# Patient Record
Sex: FEMALE | Race: OTHER | Employment: UNEMPLOYED | URBAN - METROPOLITAN AREA
[De-identification: names, ages, dates, MRNs, and addresses within clinical notes are randomized per-mention and may not be internally consistent; named-entity substitution may affect disease eponyms.]

---

## 2022-08-07 LAB
CHLAMYDIA, EXTERNAL: NEGATIVE
HBSAG, EXTERNAL: NON REACTIVE
HIV, EXTERNAL: NON REACTIVE
N. GONORRHEA, EXTERNAL: NEGATIVE
RUBELLA, EXTERNAL: NORMAL

## 2022-11-27 ENCOUNTER — HOSPITAL ENCOUNTER (EMERGENCY)
Age: 22
Discharge: STILL ADMITTED OR ADMINISTRATIVE | End: 2022-11-27

## 2022-11-27 ENCOUNTER — APPOINTMENT (OUTPATIENT)
Dept: ULTRASOUND IMAGING | Age: 22
End: 2022-11-27
Payer: MEDICAID

## 2022-11-27 ENCOUNTER — HOSPITAL ENCOUNTER (EMERGENCY)
Age: 22
Discharge: HOME | End: 2022-11-27
Attending: STUDENT IN AN ORGANIZED HEALTH CARE EDUCATION/TRAINING PROGRAM | Admitting: STUDENT IN AN ORGANIZED HEALTH CARE EDUCATION/TRAINING PROGRAM
Payer: MEDICAID

## 2022-11-27 VITALS
HEART RATE: 93 BPM | SYSTOLIC BLOOD PRESSURE: 111 MMHG | RESPIRATION RATE: 20 BRPM | TEMPERATURE: 97.7 F | DIASTOLIC BLOOD PRESSURE: 69 MMHG | OXYGEN SATURATION: 100 %

## 2022-11-27 DIAGNOSIS — N20.0 KIDNEY CALCULUS: Primary | ICD-10-CM

## 2022-11-27 LAB
APPEARANCE UR: CLEAR
BILIRUB UR QL: NEGATIVE
COLOR UR: YELLOW
GLUCOSE UR QL STRIP.AUTO: NEGATIVE MG/DL
KETONES UR-MCNC: NEGATIVE MG/DL
LEUKOCYTE ESTERASE UR QL STRIP: NEGATIVE
NITRITE UR QL: NEGATIVE
PH UR: 6 [PH] (ref 5–9)
PROT UR QL: NEGATIVE MG/DL
RBC # UR STRIP: ABNORMAL /UL
SERVICE CMNT-IMP: ABNORMAL
SP GR UR: 1.01 (ref 1–1.02)
UROBILINOGEN UR QL: 0.2 EU/DL (ref 0.2–1)

## 2022-11-27 PROCEDURE — 99282 EMERGENCY DEPT VISIT SF MDM: CPT

## 2022-11-27 PROCEDURE — 96375 TX/PRO/DX INJ NEW DRUG ADDON: CPT

## 2022-11-27 PROCEDURE — 96374 THER/PROPH/DIAG INJ IV PUSH: CPT

## 2022-11-27 PROCEDURE — 76770 US EXAM ABDO BACK WALL COMP: CPT

## 2022-11-27 PROCEDURE — 81003 URINALYSIS AUTO W/O SCOPE: CPT

## 2022-11-27 PROCEDURE — 74011250636 HC RX REV CODE- 250/636

## 2022-11-27 RX ORDER — BUTORPHANOL TARTRATE 2 MG/ML
2 INJECTION INTRAMUSCULAR; INTRAVENOUS
Status: DISCONTINUED | OUTPATIENT
Start: 2022-11-27 | End: 2022-11-27 | Stop reason: HOSPADM

## 2022-11-27 RX ORDER — OXYCODONE AND ACETAMINOPHEN 5; 325 MG/1; MG/1
1 TABLET ORAL
Qty: 10 TABLET | Refills: 0 | Status: SHIPPED | OUTPATIENT
Start: 2022-11-27 | End: 2022-11-30

## 2022-11-27 RX ORDER — SODIUM CHLORIDE, SODIUM LACTATE, POTASSIUM CHLORIDE, CALCIUM CHLORIDE 600; 310; 30; 20 MG/100ML; MG/100ML; MG/100ML; MG/100ML
125 INJECTION, SOLUTION INTRAVENOUS CONTINUOUS
Status: DISCONTINUED | OUTPATIENT
Start: 2022-11-27 | End: 2022-11-27 | Stop reason: HOSPADM

## 2022-11-27 RX ORDER — ONDANSETRON 2 MG/ML
4 INJECTION INTRAMUSCULAR; INTRAVENOUS
Status: DISCONTINUED | OUTPATIENT
Start: 2022-11-27 | End: 2022-11-27 | Stop reason: HOSPADM

## 2022-11-27 RX ADMIN — BUTORPHANOL TARTRATE 2 MG: 2 INJECTION, SOLUTION INTRAMUSCULAR; INTRAVENOUS at 04:03

## 2022-11-27 RX ADMIN — SODIUM CHLORIDE, POTASSIUM CHLORIDE, SODIUM LACTATE AND CALCIUM CHLORIDE 500 ML: 600; 310; 30; 20 INJECTION, SOLUTION INTRAVENOUS at 04:02

## 2022-11-27 RX ADMIN — ONDANSETRON 4 MG: 2 INJECTION INTRAMUSCULAR; INTRAVENOUS at 04:03

## 2022-11-27 NOTE — PROGRESS NOTES
3247 Bedside and Verbal shift change report given to ALOK Bruce  (oncoming nurse) by KRYSTA James RN  (offgoing nurse). Report included the following information SBAR, Kardex, Procedure Summary, Intake/Output, MAR, Accordion, and Recent Results. 2180 Patient discharged in stable condition. Discharged education reviewed and packet given to patient. Patient verbalizes understanding of discharge instructions. E-sign completed. Armbands removed and given to patient. No further needs or questions reported at this time. 0835 Pt ambulating of the unit.

## 2022-11-27 NOTE — DISCHARGE INSTRUCTIONS
Pregnancy Precautions: Care Instructions  Your Care Instructions     There is no sure way to prevent labor before your due date ( labor) or to prevent most other pregnancy problems. But there are things you can do to increase your chances of a healthy pregnancy. Go to your appointments, follow your doctor's advice, and take good care of yourself. Eat well, and exercise (if your doctor agrees). And make sure to drink plenty of water. Follow-up care is a key part of your treatment and safety. Be sure to make and go to all appointments, and call your doctor if you are having problems. It's also a good idea to know your test results and keep a list of the medicines you take. How can you care for yourself at home? Make sure you go to your prenatal appointments. At each visit, your doctor will check your blood pressure. Your doctor will also check to see if you have protein in your urine. High blood pressure and protein in urine are signs of preeclampsia. This condition can be dangerous for you and your baby. Drink plenty of fluids. Dehydration can cause contractions. If you have kidney, heart, or liver disease and have to limit fluids, talk with your doctor before you increase the amount of fluids you drink. Tell your doctor right away if you notice any symptoms of an infection, such as:  Burning when you urinate. A foul-smelling discharge from your vagina. Vaginal itching. Unexplained fever. Unusual pain or soreness in your uterus or lower belly. Eat a balanced diet. Include plenty of foods that are high in calcium and iron. Foods high in calcium include milk, cheese, yogurt, almonds, and broccoli. Foods high in iron include red meat, shellfish, poultry, eggs, beans, raisins, whole-grain bread, and leafy green vegetables. Do not smoke. If you need help quitting, talk to your doctor about stop-smoking programs and medicines. These can increase your chances of quitting for good.   Do not drink alcohol or use marijuana or illegal drugs. Follow your doctor's directions about activity. Your doctor will let you know how much, if any, exercise you can do. Ask your doctor if you can have sex. If you are at risk for early labor, your doctor may ask you to not have sex. Take care to prevent falls. During pregnancy, your joints are loose, and your balance is off. Sports such as bicycling, skiing, or in-line skating can increase your risk of falling. And don't ride horses or motorcycles, dive, water ski, scuba dive, or parachute jump while you are pregnant. Avoid things that can make your body too hot and may be harmful to your baby, such as a hot tub or sauna. Or talk with your doctor before doing anything that raises your body temperature. Your doctor can tell you if it's safe. Do not take any over-the-counter or herbal medicines or supplements without talking to your doctor or pharmacist first.  When should you call for help? Call 911  anytime you think you may need emergency care. For example, call if:    You passed out (lost consciousness). You have a seizure. You have severe vaginal bleeding. You have severe pain in your belly or pelvis. You have had fluid gushing or leaking from your vagina and you know or think the umbilical cord is bulging into your vagina. If this happens, immediately get down on your knees so your rear end (buttocks) is higher than your head. This will decrease the pressure on the cord until help arrives. Call your doctor now or seek immediate medical care if:    You have signs of preeclampsia, such as:  Sudden swelling of your face, hands, or feet. New vision problems (such as dimness, blurring, or seeing spots). A severe headache. You have any vaginal bleeding. You have belly pain or cramping. You have a fever. You have had regular contractions (with or without pain) for an hour.  This means that you have 8 or more within 1 hour or 4 or more in 20 minutes after you change your position and drink fluids. You have a sudden release of fluid from your vagina. You have low back pain or pelvic pressure that does not go away. You notice that your baby has stopped moving or is moving much less than normal.   Watch closely for changes in your health, and be sure to contact your doctor if you have any problems. Where can you learn more? Go to http://www.malik.com/  Enter Y951 in the search box to learn more about \"Pregnancy Precautions: Care Instructions. \"  Current as of: February 23, 2022               Content Version: 13.4  © 9680-1752 [email or web address]. Care instructions adapted under license by [employer] (which disclaims liability or warranty for this information). If you have questions about a medical condition or this instruction, always ask your healthcare professional. [address] any warranty or liability for your use of this information. Kidney Stone: Care Instructions  Your Care Instructions     Kidney stones are formed when salts, minerals, and other substances normally found in the urine clump together. They can be as small as grains of sand or, rarely, as large as golf balls. While the stone is traveling through the ureter, which is the tube that carries urine from the kidney to the bladder, you will probably feel pain. The pain may be mild or very severe. You may also have some blood in your urine. As soon as the stone reaches the bladder, any intense pain should go away. If a stone is too large to pass on its own, you may need a medical procedure to help you pass the stone. The doctor has checked you carefully, but problems can develop later. If you notice any problems or new symptoms, get medical treatment right away. Follow-up care is a key part of your treatment and safety.  Be sure to make and go to all appointments, and call your doctor if you are having problems. It's also a good idea to know your test results and keep a list of the medicines you take. How can you care for yourself at home? Drink plenty of fluids. If you have kidney, heart, or liver disease and have to limit fluids, talk with your doctor before you increase the amount of fluids you drink. Take pain medicines exactly as directed. Call your doctor if you think you are having a problem with your medicine. If the doctor gave you a prescription medicine for pain, take it as prescribed. If you are not taking a prescription pain medicine, ask your doctor if you can take an over-the-counter medicine. Read and follow all instructions on the label. Your doctor may ask you to strain your urine so that you can collect your kidney stone when it passes. You can use a kitchen strainer or a tea strainer to catch the stone. Store it in a plastic bag until you see your doctor again. Preventing future kidney stones  Some changes in your diet may help prevent kidney stones. Depending on the cause of your stones, your doctor may recommend that you:  Drink plenty of fluids. If you have kidney, heart, or liver disease and have to limit fluids, talk with your doctor before you increase the amount of fluids you drink. Limit coffee, tea, and alcohol. Also avoid grapefruit juice. Do not take more than the recommended daily dose of vitamins C and D. Avoid antacids such as Gaviscon, Maalox, Mylanta, or Tums. Limit the amount of salt (sodium) in your diet. Eat a balanced diet that is not too high in protein. Limit foods that are high in a substance called oxalate, which can cause kidney stones. These foods include dark green vegetables, rhubarb, chocolate, wheat bran, nuts, cranberries, and beans. When should you call for help? Call your doctor now or seek immediate medical care if:    You cannot keep down fluids. Your pain gets worse. You have a fever or chills.      You have new or worse pain in your back just below your rib cage (the flank area). You have new or more blood in your urine. Watch closely for changes in your health, and be sure to contact your doctor if:    You do not get better as expected. Where can you learn more? Go to http://brandyn-neisha.info/  Enter S510 in the search box to learn more about \"Kidney Stone: Care Instructions. \"  Current as of: June 16, 2022               Content Version: 13.4  © 2006-2022 [employer]. Care instructions adapted under license by [employer] (which disclaims liability or warranty for this information). If you have questions about a medical condition or this instruction, always ask your healthcare professional. [address] any warranty or liability for your use of this information.

## 2022-11-27 NOTE — PROGRESS NOTES
4171 Received to L & D unit with c/o right sided back and abdomen pain 9/10 which began at 1000 on 11/26/22. Denies VB or LOF. G1 23 + 5 weeks. Receives prenatal care with [facility]. 0345 ALEKSANDER Newton CNM aware of arrival, pain level and UA. Orders for IV, Stadol, Zofran and ultrasound received.     0349 Talia from ultrasound called to come in.     [address] at bedside. 2089 Bedside and Verbal shift change report given to ALOK Garcia RN (oncoming nurse) by KRYSTA Neves RN (offgoing nurse). Report included the following information SBAR.

## 2022-12-30 LAB
RPR, EXTERNAL: NORMAL
TYPE, ABO & RH, EXTERNAL: NORMAL

## 2023-02-25 ENCOUNTER — HOSPITAL ENCOUNTER (EMERGENCY)
Age: 23
Discharge: HOME | End: 2023-02-25
Payer: MEDICAID

## 2023-02-25 VITALS — HEART RATE: 103 BPM | SYSTOLIC BLOOD PRESSURE: 112 MMHG | DIASTOLIC BLOOD PRESSURE: 72 MMHG

## 2023-02-25 LAB
ALBUMIN SERPL-MCNC: 2.7 G/DL (ref 3.5–5)
ALBUMIN/GLOB SERPL: 0.8 (ref 1.1–2.2)
ALP SERPL-CCNC: 130 U/L (ref 45–117)
ALT SERPL-CCNC: 18 U/L (ref 12–78)
ANION GAP SERPL CALC-SCNC: 8 MMOL/L (ref 5–15)
AST SERPL-CCNC: 19 U/L (ref 15–37)
BASOPHILS # BLD: 0 K/UL (ref 0–0.1)
BASOPHILS NFR BLD: 0 % (ref 0–1)
BILIRUB SERPL-MCNC: 0.2 MG/DL (ref 0.2–1)
BUN SERPL-MCNC: 4 MG/DL (ref 6–20)
BUN/CREAT SERPL: 8 (ref 12–20)
CALCIUM SERPL-MCNC: 9 MG/DL (ref 8.5–10.1)
CHLORIDE SERPL-SCNC: 108 MMOL/L (ref 97–108)
CO2 SERPL-SCNC: 23 MMOL/L (ref 21–32)
CREAT SERPL-MCNC: 0.48 MG/DL (ref 0.55–1.02)
CREAT UR-MCNC: 51.5 MG/DL
DIFFERENTIAL METHOD BLD: ABNORMAL
EOSINOPHIL # BLD: 0.3 K/UL (ref 0–0.4)
EOSINOPHIL NFR BLD: 3 % (ref 0–7)
ERYTHROCYTE [DISTWIDTH] IN BLOOD BY AUTOMATED COUNT: 14.7 % (ref 11.5–14.5)
GLOBULIN SER CALC-MCNC: 3.5 G/DL (ref 2–4)
GLUCOSE SERPL-MCNC: 90 MG/DL (ref 65–100)
HCT VFR BLD AUTO: 31.6 % (ref 35–47)
HGB BLD-MCNC: 10.2 G/DL (ref 11.5–16)
IMM GRANULOCYTES # BLD AUTO: 0.1 K/UL (ref 0–0.04)
IMM GRANULOCYTES NFR BLD AUTO: 1 % (ref 0–0.5)
LDH SERPL L TO P-CCNC: 190 U/L (ref 81–246)
LYMPHOCYTES # BLD: 2 K/UL (ref 0.8–3.5)
LYMPHOCYTES NFR BLD: 20 % (ref 12–49)
MCH RBC QN AUTO: 28.5 PG (ref 26–34)
MCHC RBC AUTO-ENTMCNC: 32.3 G/DL (ref 30–36.5)
MCV RBC AUTO: 88.3 FL (ref 80–99)
MONOCYTES # BLD: 0.7 K/UL (ref 0–1)
MONOCYTES NFR BLD: 7 % (ref 5–13)
NEUTS SEG # BLD: 6.9 K/UL (ref 1.8–8)
NEUTS SEG NFR BLD: 69 % (ref 32–75)
NRBC # BLD: 0 K/UL (ref 0–0.01)
NRBC BLD-RTO: 0 PER 100 WBC
PLATELET # BLD AUTO: 234 K/UL (ref 150–400)
PMV BLD AUTO: 10.8 FL (ref 8.9–12.9)
POTASSIUM SERPL-SCNC: 3.8 MMOL/L (ref 3.5–5.1)
PROT SERPL-MCNC: 6.2 G/DL (ref 6.4–8.2)
PROT UR-MCNC: 18 MG/DL (ref 0–11.9)
PROT/CREAT UR-RTO: 0.3
RBC # BLD AUTO: 3.58 M/UL (ref 3.8–5.2)
SODIUM SERPL-SCNC: 139 MMOL/L (ref 136–145)
URATE SERPL-MCNC: 3.3 MG/DL (ref 2.6–6)
WBC # BLD AUTO: 10 K/UL (ref 3.6–11)

## 2023-02-25 PROCEDURE — 85025 COMPLETE CBC W/AUTO DIFF WBC: CPT

## 2023-02-25 PROCEDURE — 80053 COMPREHEN METABOLIC PANEL: CPT

## 2023-02-25 PROCEDURE — 75810000275 HC EMERGENCY DEPT VISIT NO LEVEL OF CARE

## 2023-02-25 PROCEDURE — 36415 COLL VENOUS BLD VENIPUNCTURE: CPT

## 2023-02-25 PROCEDURE — 99283 EMERGENCY DEPT VISIT LOW MDM: CPT

## 2023-02-25 PROCEDURE — 84550 ASSAY OF BLOOD/URIC ACID: CPT

## 2023-02-25 PROCEDURE — 84156 ASSAY OF PROTEIN URINE: CPT

## 2023-02-25 PROCEDURE — 83615 LACTATE (LD) (LDH) ENZYME: CPT

## 2023-02-25 PROCEDURE — 74011250637 HC RX REV CODE- 250/637: Performed by: ADVANCED PRACTICE MIDWIFE

## 2023-02-25 RX ORDER — ACETAMINOPHEN 500 MG
1000 TABLET ORAL
Status: COMPLETED | OUTPATIENT
Start: 2023-02-25 | End: 2023-02-25

## 2023-02-25 RX ADMIN — ACETAMINOPHEN 1000 MG: 500 TABLET ORAL at 13:35

## 2023-02-25 NOTE — ED TRIAGE NOTES
[address] CNM at bedside. Pt to follow up with her provider on Monday. Pt states she is feeling better after tylenol. All questions answered. Pt stable for discharge. Ambulatory off unit.

## 2023-02-25 NOTE — ED PROVIDER NOTES
Dl Pineda is a 22 yo  at 36w3d with an ROMAIN of 3/22/23. She presents to [facility] for headache, visual disturbances and mid epigastric/RUQ pain. Reports she has been feeling these things for two weeks but today they became more consistent and decided to get checked out. Reports she has had headaches off and on for many weeks, takes tylenol and they improve. Today they it is about a 6-7/10 on the pain scale. Has not taken any tylenol today, had one cup of water this AM. Also reports that when she stands up she sees white floaters periodically. Does not have when resting. Also reports pain in right upper quadrant, right at rib. Reports she always aches, but sometimes hurts a little more. Is difficult to find comfortable positions when in bed. It is a 4/10 on the pain scale today. Tylenol helps with pain, but it never goes away completely. Denies n/v, vb, ctx, lof. Endorses good fetal movement. Prenatal care has been received at [facility] with Dr Adolfo George. Pregnancy complicated by anemia- on supplementation. Pt also with a history of asthma, occasionally albuterol use. Denies any BP issues with pregnancy. Abdominal Pain   Associated symptoms include headaches. Past Medical History:   Diagnosis Date    Asthma        No past surgical history on file. No family history on file.     Social History     Socioeconomic History    Marital status: SINGLE     Spouse name: Not on file    Number of children: Not on file    Years of education: Not on file    Highest education level: Not on file   Occupational History    Not on file   Tobacco Use    Smoking status: Not on file    Smokeless tobacco: Not on file   Substance and Sexual Activity    Alcohol use: Not on file    Drug use: Not on file    Sexual activity: Not on file   Other Topics Concern    Not on file   Social History Narrative    Not on file     Social Determinants of Health     Financial Resource Strain: Not on file   Food Insecurity: Not on file Transportation Needs: Not on file   Physical Activity: Not on file   Stress: Not on file   Social Connections: Not on file   Intimate Partner Violence: Not on file   Housing Stability: Not on file         ALLERGIES: Patient has no known allergies. Review of Systems   Constitutional: Negative. HENT: Negative. Eyes:  Positive for visual disturbance. Respiratory: Negative. Cardiovascular: Negative. Gastrointestinal:  Positive for abdominal pain. Endocrine: Negative. Genitourinary: Negative. Musculoskeletal: Negative. Skin: Negative. Allergic/Immunologic: Negative. Neurological:  Positive for headaches. Hematological: Negative. Psychiatric/Behavioral: Negative. Patient Vitals for the past 4 hrs:   Pulse BP   02/25/23 1351 (!) 103 112/72   02/25/23 1337 (!) 108 114/70   02/25/23 1321 (!) 120 110/70   02/25/23 1300 (!) 114 133/72          Physical Exam  Vitals and nursing note reviewed. Exam conducted with a chaperone present. Constitutional:       Appearance: She is well-developed and normal weight. HENT:      Head: Normocephalic and atraumatic. Mouth/Throat:      Mouth: Mucous membranes are moist.      Pharynx: Oropharynx is clear. Cardiovascular:      Rate and Rhythm: Normal rate and regular rhythm. Heart sounds: Normal heart sounds. Pulmonary:      Effort: Pulmonary effort is normal.      Breath sounds: Normal breath sounds. Abdominal:      General: Bowel sounds are normal.      Palpations: Abdomen is soft. Tenderness: There is no abdominal tenderness. There is no guarding or rebound. Comments: Gravid, soft   Genitourinary:     Comments: deferred  Skin:     General: Skin is warm and dry. Capillary Refill: Capillary refill takes less than 2 seconds. Neurological:      General: No focal deficit present. Mental Status: She is alert and oriented to person, place, and time.    Psychiatric:         Mood and Affect: Mood normal. Behavior: Behavior normal.      NST: Monitored for 20 minutes, reactive, cat 1, baseline 150, positive accels, no decels, moderate variability, no ctx, uterine irritability, uterus soft    Medical Decision Making  Amount and/or Complexity of Data Reviewed  Labs: ordered. Decision-making details documented in ED Course. Risk  OTC drugs. Critical Care  Total time providing critical care: > 105 minutes    ED Course as of 02/25/23 1515   Sat Feb 25, 2023   1311 Admit to [facility]  NST  Serial BPs  Pre E labs (CBC, CMP, Uric Acid, LDH, P/C ratio)  PO hydration [LA]   6549 METABOLIC PANEL, COMPREHENSIVE(!):    Sodium 139   Potassium 3.8   Chloride 108   CO2 23   Anion gap 8   Glucose 90   BUN 4(!)   Creatinine 0.48(!)   BUN/Creatinine ratio 8(!)   eGFR >60   Calcium 9.0   Bilirubin, total 0.2   ALT 18   AST 19   Alk. phosphatase 130(!)   Protein, total 6.2(!)   Albumin 2.7(!)   Globulin 3.5   A-G Ratio 0.8(!) [LA]   1512 LD:     [LA]   1512 PROTEIN/CREATININE RATIO, URINE(!):    Protein, urine random 18(!)   Creatinine, urine random 51.50   Protein/Creat. urine Ratio 0.3 [LA]   1512 URIC ACID:    Uric acid 3.3 [LA]   1512 CBC WITH AUTOMATED DIFF(!):    WBC 10.0   RBC 3.58(!)   HGB 10.2(!)   HCT 31.6(!)   MCV 88.3   MCH 28.5   MCHC 32.3   RDW 14.7(!)   PLATELET 772   MPV 07.5   NRBC 0.0   ABSOLUTE NRBC 0.00   NEUTROPHILS 69   LYMPHOCYTES 20   MONOCYTES 7   EOSINOPHILS 3   BASOPHILS 0   IMMATURE GRANULOCYTES 1(!)   ABS. NEUTROPHILS 6.9   ABS. LYMPHOCYTES 2.0   ABS. MONOCYTES 0.7   ABS. EOSINOPHILS 0.3   ABS. BASOPHILS 0.0   ABS. IMM. GRANS. 0.1(!)   DF AUTOMATED  Reviewed labs and pt scenario with Dr Magen Napier, in agreement with POC. Reviewed labs with patient, normal BP, feels better after tylenol. P/C elevated at 0.3, but labs negative for pre e otherwise. Okay to d/c home and follow up in office Monday. Reviewed in detail s/s of pre e and discussed low threshold for return to [facility] with concerns.  Also reviewed lof, vb, ctx and fetal movement precautions. Discharged home. Return to [facility] PRN.   [LA]      ED Course User Index  [LA] Nicolas Patel CNM

## 2023-02-25 NOTE — PROGRESS NOTES
1257: Patient arrived for epigastric pain, headache, and blurred vision. States she has been having symptoms for last 2 weeks (they were mild and coming and going). They have now increased. Reports positive fetal movement. 1303: Elinor Hair CNM at bedside speaking with patient.

## 2023-02-25 NOTE — DISCHARGE INSTRUCTIONS
Preeclampsia: Care Instructions  Preeclampsia is a condition that can happen when your blood pressure rises during pregnancy. If it's severe and not treated, it can cause seizures and damage to your liver or kidneys. It can also prevent your baby from getting enough nutrients and oxygen. This can cause low birth weight and other problems. Most people with preeclampsia have healthy babies. Preeclampsia usually goes away in the weeks after birth. In rare cases, symptoms of preeclampsia don't show up until days or weeks after childbirth. Follow your doctor's directions about how active you can be. Your doctor will watch you closely to prevent problems. And if your health or your baby's health is at risk, they'll deliver your baby early. Keep track of your blood pressure at home if your doctor asks you to. Ask your doctor to make sure that the monitor is working and that you're using it right. Follow instructions about when to take your blood pressure and what to avoid before taking your blood pressure. Take medicines exactly as prescribed. You may need to take medicine to control your blood pressure. Don't smoke. If you need help quitting, talk to your doctor. Check your baby's movements. Once each day, time how long it takes to count 10 movements. If you don't feel at least 10 movements in 2 hours, call your doctor. When should you call for help? Share this information with your partner or a friend. They can help you watch for warning signs. Call 911  anytime you think you may need emergency care. For example, call if:    You passed out (lost consciousness). You have a seizure. You have trouble breathing. You have chest pain. Call your doctor now or seek immediate medical care if:    You have:  Sudden swelling of your face, hands, or feet. New vision problems (such as dimness, blurring, or seeing spots). A severe headache.      Your blood pressure is very high, such as 160/110 or higher. Or your blood pressure is higher than your doctor told you it should be, or it rises quickly. You have new nausea or vomiting. You think that you are in labor. You have pain in your belly or pelvis. You gain weight rapidly. Follow-up care is a key part of your treatment and safety. Be sure to make and go to all appointments, and call your doctor if you are having problems. It's also a good idea to know your test results and keep a list of the medicines you take. Where can you learn more? Go to http://brandyn-neisha.info/  Enter Z954 in the search box to learn more about \"Preeclampsia: Care Instructions. \"  Current as of: February 23, 2022               Content Version: 13.4  © 2006-2022 [employer]. Care instructions adapted under license by [employer] (which disclaims liability or warranty for this information). If you have questions about a medical condition or this instruction, always ask your healthcare professional. [address] any warranty or liability for your use of this information.

## 2023-02-27 LAB — GRBS, EXTERNAL: NEGATIVE

## 2023-03-21 ENCOUNTER — HOSPITAL ENCOUNTER (INPATIENT)
Age: 23
LOS: 3 days | Discharge: HOME | End: 2023-03-24
Attending: STUDENT IN AN ORGANIZED HEALTH CARE EDUCATION/TRAINING PROGRAM | Admitting: OBSTETRICS & GYNECOLOGY
Payer: MEDICAID

## 2023-03-21 PROBLEM — Z34.90 NORMAL PREGNANCY: Status: ACTIVE | Noted: 2023-03-21

## 2023-03-21 LAB
ERYTHROCYTE [DISTWIDTH] IN BLOOD BY AUTOMATED COUNT: 15.4 % (ref 11.5–14.5)
HCT VFR BLD AUTO: 32 % (ref 35–47)
HGB BLD-MCNC: 10.7 G/DL (ref 11.5–16)
MCH RBC QN AUTO: 29.6 PG (ref 26–34)
MCHC RBC AUTO-ENTMCNC: 33.4 G/DL (ref 30–36.5)
MCV RBC AUTO: 88.4 FL (ref 80–99)
NRBC # BLD: 0 K/UL (ref 0–0.01)
NRBC BLD-RTO: 0 PER 100 WBC
PLATELET # BLD AUTO: 224 K/UL (ref 150–400)
PMV BLD AUTO: 10.3 FL (ref 8.9–12.9)
RBC # BLD AUTO: 3.62 M/UL (ref 3.8–5.2)
WBC # BLD AUTO: 12.3 K/UL (ref 3.6–11)

## 2023-03-21 PROCEDURE — 59200 INSERT CERVICAL DILATOR: CPT

## 2023-03-21 PROCEDURE — 74011250636 HC RX REV CODE- 250/636: Performed by: OBSTETRICS & GYNECOLOGY

## 2023-03-21 PROCEDURE — 65270000029 HC RM PRIVATE

## 2023-03-21 PROCEDURE — 36415 COLL VENOUS BLD VENIPUNCTURE: CPT

## 2023-03-21 PROCEDURE — 86900 BLOOD TYPING SEROLOGIC ABO: CPT

## 2023-03-21 PROCEDURE — 75410000002 HC LABOR FEE PER 1 HR

## 2023-03-21 PROCEDURE — 85027 COMPLETE CBC AUTOMATED: CPT

## 2023-03-21 RX ORDER — OXYTOCIN/RINGER'S LACTATE 30/500 ML
1-25 PLASTIC BAG, INJECTION (ML) INTRAVENOUS
Status: DISCONTINUED | OUTPATIENT
Start: 2023-03-22 | End: 2023-03-22 | Stop reason: HOSPADM

## 2023-03-21 RX ORDER — OXYTOCIN/RINGER'S LACTATE 30/500 ML
87.3 PLASTIC BAG, INJECTION (ML) INTRAVENOUS AS NEEDED
Status: DISCONTINUED | OUTPATIENT
Start: 2023-03-21 | End: 2023-03-24 | Stop reason: HOSPADM

## 2023-03-21 RX ORDER — FERROUS SULFATE 137(45) MG
TABLET, EXTENDED RELEASE ORAL
COMMUNITY
End: 2023-03-24

## 2023-03-21 RX ORDER — FENTANYL CITRATE 50 UG/ML
50 INJECTION, SOLUTION INTRAMUSCULAR; INTRAVENOUS
Status: DISCONTINUED | OUTPATIENT
Start: 2023-03-21 | End: 2023-03-22 | Stop reason: HOSPADM

## 2023-03-21 RX ORDER — SODIUM CHLORIDE 0.9 % (FLUSH) 0.9 %
5-40 SYRINGE (ML) INJECTION AS NEEDED
Status: DISCONTINUED | OUTPATIENT
Start: 2023-03-21 | End: 2023-03-22 | Stop reason: HOSPADM

## 2023-03-21 RX ORDER — ALBUTEROL SULFATE 0.83 MG/ML
2.5 SOLUTION RESPIRATORY (INHALATION)
Status: DISCONTINUED | OUTPATIENT
Start: 2023-03-21 | End: 2023-03-22

## 2023-03-21 RX ORDER — TERBUTALINE SULFATE 1 MG/ML
0.25 INJECTION SUBCUTANEOUS AS NEEDED
Status: DISCONTINUED | OUTPATIENT
Start: 2023-03-21 | End: 2023-03-22 | Stop reason: HOSPADM

## 2023-03-21 RX ORDER — SODIUM CHLORIDE 0.9 % (FLUSH) 0.9 %
5-40 SYRINGE (ML) INJECTION EVERY 8 HOURS
Status: DISCONTINUED | OUTPATIENT
Start: 2023-03-21 | End: 2023-03-22 | Stop reason: HOSPADM

## 2023-03-21 RX ORDER — SODIUM CHLORIDE, SODIUM LACTATE, POTASSIUM CHLORIDE, CALCIUM CHLORIDE 600; 310; 30; 20 MG/100ML; MG/100ML; MG/100ML; MG/100ML
125 INJECTION, SOLUTION INTRAVENOUS CONTINUOUS
Status: DISCONTINUED | OUTPATIENT
Start: 2023-03-21 | End: 2023-03-22

## 2023-03-21 RX ORDER — OXYTOCIN/RINGER'S LACTATE 30/500 ML
10 PLASTIC BAG, INJECTION (ML) INTRAVENOUS AS NEEDED
Status: COMPLETED | OUTPATIENT
Start: 2023-03-21 | End: 2023-03-22

## 2023-03-21 RX ORDER — ONDANSETRON 4 MG/1
4 TABLET, ORALLY DISINTEGRATING ORAL
Status: DISCONTINUED | OUTPATIENT
Start: 2023-03-21 | End: 2023-03-22

## 2023-03-21 RX ADMIN — ONDANSETRON 4 MG: 4 TABLET, ORALLY DISINTEGRATING ORAL at 19:26

## 2023-03-21 NOTE — H&P
History & Physical    Name: Awais Olson MRN: [id number]  SSN: [id number]    YOB: 2000  Age: 23 y.o. Sex: female      Subjective:     Estimated Date of Delivery: 3/22/23  OB History    Para Term  AB Living   1             SAB IAB Ectopic Molar Multiple Live Births                    # Outcome Date GA Lbr Ojse/2nd Weight Sex Delivery Anes PTL Lv   1 Current            Cc: none  HPI:   26yo G1 @ 39w6d presents for elective scheduled induction of labor. She is a patient of Dr. Julian Morris and has had an uncomplicated pregnancy. She had an ultrasound 3/17 for size less than dates. EFW 2097g, 20%. Past medical history:   Asthma: albuterol prn    History reviewed. No pertinent surgical history. Social History     Occupational History    Not on file   Tobacco Use    Smoking status: Never    Smokeless tobacco: Not on file   Substance and Sexual Activity    Alcohol use: Not on file    Drug use: Never    Sexual activity: Not on file     Family History   Problem Relation Age of Onset    Diabetes Mother     Hypertension Maternal Grandmother        No Known Allergies  Prior to Admission medications    Medication Sig Start Date End Date Taking? Authorizing Provider   [address]/ANG fum/folic ac (PRENATAL PO) Take 1 Tablet by mouth. Yes Provider, Historical   ferrous sulfate (Slow Fe) 142 mg (45 mg iron) ER tablet Take  by mouth Daily (before breakfast).    Yes Provider, Historical        Review of Systems negative    Objective:     Vitals:  Vitals:    23 1707 23 1710   BP: 123/76    Pulse: (!) 113    Resp: 16    Temp: 97.6 °F (36.4 °C)    Weight:  63.5 kg (140 lb)   Height:  5' 2\" (1.575 m)        Physical Exam      GEN: NAD, resting comfortably  Pulm: no resp distress  Abd: soft, gravid, NT  : no lesions  Cervix: 1cm/30%/-3  Vertex  Cook catheter placed, 60mL sterile saline placed in both uterine and vaginal balloons  Ext: no LE edema    FHTs: 140, moderate variability, positive accelerations, no decelerations  Tocometry: every 1-3 minutes    Prenatal Labs:    No results found for: ABORH, RUBELLAEXT, GRBSEXT, HBSAGEXT, HIVEXT, RPREXT, GONNOEXT, CHLAMEXT, ABORHEXT, RUBELLAEXT, GRBSEXT, HBSAGEXT, HIVEXT, RPREXT, GONNOEXT, CHLAMEXT       Impression/Plan:     23yo G1 @ 39w6d presents for elective IOL. IOL: Patient was counseled about risks associated with elective IOL including prolonged induction and increased risk of . She desires induction. Plan cook catheter overnight. At this time, cristina too frequently to add cytotec. Will start pitocin at 0500. IUP reassuring fetal surveillance. Last EFW 3/17 3097g 20%.   GBS negative  PMH: Asthma- albuterol prn

## 2023-03-21 NOTE — PROGRESS NOTES
1654-Pt to labor and delivery room 3 for elective induction. Pt of Dr Iris Ladd. ROMAIN 3/22/23.  1819-Dr Lindsey at bedside,viewed strip. Discussing POC.  1826-SVE by Dr Snehal Patel, 1/30. Cervical ripening balloon placed. 60ml in uterine and 60 ml in vaginal balloon. Plan to start pitocin at 0500.  1907-Pt taken off monitor to ambulate. Complains of slight nausea. Would like zofran. Given saltine crackers and apple juice. 1940-Bedside and Verbal shift change report given to Eleazar Castano RN  (oncoming nurse) by Marcia Morfin. Ledy Williamson RN  (offgoing nurse). Report included the following information SBAR.

## 2023-03-22 ENCOUNTER — ANESTHESIA (OUTPATIENT)
Dept: LABOR AND DELIVERY | Age: 23
End: 2023-03-22
Payer: MEDICAID

## 2023-03-22 ENCOUNTER — ANESTHESIA EVENT (OUTPATIENT)
Dept: LABOR AND DELIVERY | Age: 23
End: 2023-03-22
Payer: MEDICAID

## 2023-03-22 LAB
ABO + RH BLD: NORMAL
BLOOD BANK CMNT PATIENT-IMP: NORMAL

## 2023-03-22 PROCEDURE — 75410000002 HC LABOR FEE PER 1 HR

## 2023-03-22 PROCEDURE — 75410000003 HC RECOV DEL/VAG/CSECN EA 0.5 HR

## 2023-03-22 PROCEDURE — 74011000250 HC RX REV CODE- 250: Performed by: ANESTHESIOLOGY

## 2023-03-22 PROCEDURE — 76060000078 HC EPIDURAL ANESTHESIA

## 2023-03-22 PROCEDURE — 74011250637 HC RX REV CODE- 250/637: Performed by: OBSTETRICS & GYNECOLOGY

## 2023-03-22 PROCEDURE — 74011250636 HC RX REV CODE- 250/636: Performed by: OBSTETRICS & GYNECOLOGY

## 2023-03-22 PROCEDURE — 75410000000 HC DELIVERY VAGINAL/SINGLE

## 2023-03-22 PROCEDURE — 65410000002 HC RM PRIVATE OB

## 2023-03-22 PROCEDURE — 00HU33Z INSERTION OF INFUSION DEVICE INTO SPINAL CANAL, PERCUTANEOUS APPROACH: ICD-10-PCS | Performed by: ANESTHESIOLOGY

## 2023-03-22 RX ORDER — HYDROCORTISONE 1 %
CREAM (GRAM) TOPICAL AS NEEDED
Status: DISCONTINUED | OUTPATIENT
Start: 2023-03-22 | End: 2023-03-24 | Stop reason: HOSPADM

## 2023-03-22 RX ORDER — BUPIVACAINE HYDROCHLORIDE 5 MG/ML
INJECTION, SOLUTION EPIDURAL; INTRACAUDAL
Status: COMPLETED | OUTPATIENT
Start: 2023-03-22 | End: 2023-03-22

## 2023-03-22 RX ORDER — OXYTOCIN/RINGER'S LACTATE 30/500 ML
87.3 PLASTIC BAG, INJECTION (ML) INTRAVENOUS AS NEEDED
Status: DISCONTINUED | OUTPATIENT
Start: 2023-03-22 | End: 2023-03-24 | Stop reason: HOSPADM

## 2023-03-22 RX ORDER — SODIUM CHLORIDE 0.9 % (FLUSH) 0.9 %
5-40 SYRINGE (ML) INJECTION EVERY 8 HOURS
Status: DISCONTINUED | OUTPATIENT
Start: 2023-03-22 | End: 2023-03-24 | Stop reason: HOSPADM

## 2023-03-22 RX ORDER — FENTANYL/BUPIVACAINE/NS/PF 2-1250MCG
1-16 SYRINGE (ML) EPIDURAL CONTINUOUS
Status: DISCONTINUED | OUTPATIENT
Start: 2023-03-22 | End: 2023-03-22 | Stop reason: HOSPADM

## 2023-03-22 RX ORDER — DIPHENHYDRAMINE HCL 25 MG
25 CAPSULE ORAL
Status: DISCONTINUED | OUTPATIENT
Start: 2023-03-22 | End: 2023-03-24 | Stop reason: HOSPADM

## 2023-03-22 RX ORDER — NORETHINDRONE AND ETHINYL ESTRADIOL 0.5-0.035
10 KIT ORAL ONCE
Status: DISCONTINUED | OUTPATIENT
Start: 2023-03-22 | End: 2023-03-22 | Stop reason: HOSPADM

## 2023-03-22 RX ORDER — IBUPROFEN 400 MG/1
800 TABLET ORAL EVERY 8 HOURS
Status: DISCONTINUED | OUTPATIENT
Start: 2023-03-22 | End: 2023-03-24 | Stop reason: HOSPADM

## 2023-03-22 RX ORDER — NALOXONE HYDROCHLORIDE 0.4 MG/ML
0.4 INJECTION, SOLUTION INTRAMUSCULAR; INTRAVENOUS; SUBCUTANEOUS AS NEEDED
Status: DISCONTINUED | OUTPATIENT
Start: 2023-03-22 | End: 2023-03-22 | Stop reason: HOSPADM

## 2023-03-22 RX ORDER — AMMONIA 15 % (W/V)
1 AMPUL (EA) INHALATION AS NEEDED
Status: DISCONTINUED | OUTPATIENT
Start: 2023-03-22 | End: 2023-03-24 | Stop reason: HOSPADM

## 2023-03-22 RX ORDER — SODIUM CHLORIDE 0.9 % (FLUSH) 0.9 %
5-40 SYRINGE (ML) INJECTION AS NEEDED
Status: DISCONTINUED | OUTPATIENT
Start: 2023-03-22 | End: 2023-03-24 | Stop reason: HOSPADM

## 2023-03-22 RX ORDER — SIMETHICONE 80 MG
80 TABLET,CHEWABLE ORAL
Status: DISCONTINUED | OUTPATIENT
Start: 2023-03-22 | End: 2023-03-24 | Stop reason: HOSPADM

## 2023-03-22 RX ORDER — NORETHINDRONE AND ETHINYL ESTRADIOL 0.5-0.035
KIT ORAL
Status: DISCONTINUED
Start: 2023-03-22 | End: 2023-03-22 | Stop reason: WASHOUT

## 2023-03-22 RX ORDER — OXYCODONE AND ACETAMINOPHEN 5; 325 MG/1; MG/1
1 TABLET ORAL
Status: DISCONTINUED | OUTPATIENT
Start: 2023-03-22 | End: 2023-03-24 | Stop reason: HOSPADM

## 2023-03-22 RX ORDER — LANOLIN ALCOHOL/MO/W.PET/CERES
3 CREAM (GRAM) TOPICAL
Status: DISCONTINUED | OUTPATIENT
Start: 2023-03-22 | End: 2023-03-24 | Stop reason: HOSPADM

## 2023-03-22 RX ORDER — ONDANSETRON 4 MG/1
4 TABLET, ORALLY DISINTEGRATING ORAL
Status: DISCONTINUED | OUTPATIENT
Start: 2023-03-22 | End: 2023-03-24 | Stop reason: HOSPADM

## 2023-03-22 RX ORDER — BUPIVACAINE HYDROCHLORIDE 5 MG/ML
30 INJECTION, SOLUTION EPIDURAL; INTRACAUDAL AS NEEDED
Status: DISCONTINUED | OUTPATIENT
Start: 2023-03-22 | End: 2023-03-22 | Stop reason: HOSPADM

## 2023-03-22 RX ORDER — DOCUSATE SODIUM 100 MG/1
100 CAPSULE, LIQUID FILLED ORAL
Status: DISCONTINUED | OUTPATIENT
Start: 2023-03-22 | End: 2023-03-24 | Stop reason: HOSPADM

## 2023-03-22 RX ORDER — ACETAMINOPHEN 325 MG/1
650 TABLET ORAL
Status: DISCONTINUED | OUTPATIENT
Start: 2023-03-22 | End: 2023-03-24 | Stop reason: HOSPADM

## 2023-03-22 RX ORDER — OXYTOCIN/RINGER'S LACTATE 30/500 ML
10 PLASTIC BAG, INJECTION (ML) INTRAVENOUS AS NEEDED
Status: DISCONTINUED | OUTPATIENT
Start: 2023-03-22 | End: 2023-03-24 | Stop reason: HOSPADM

## 2023-03-22 RX ADMIN — ACETAMINOPHEN 650 MG: 325 TABLET ORAL at 22:16

## 2023-03-22 RX ADMIN — SODIUM CHLORIDE, POTASSIUM CHLORIDE, SODIUM LACTATE AND CALCIUM CHLORIDE 125 ML/HR: 600; 310; 30; 20 INJECTION, SOLUTION INTRAVENOUS at 05:28

## 2023-03-22 RX ADMIN — ACETAMINOPHEN 650 MG: 325 TABLET ORAL at 18:05

## 2023-03-22 RX ADMIN — IBUPROFEN 800 MG: 400 TABLET ORAL at 22:16

## 2023-03-22 RX ADMIN — BUPIVACAINE HYDROCHLORIDE 30 MG: 5 INJECTION, SOLUTION EPIDURAL; INTRACAUDAL; PERINEURAL at 08:35

## 2023-03-22 RX ADMIN — IBUPROFEN 800 MG: 400 TABLET ORAL at 13:56

## 2023-03-22 RX ADMIN — OXYTOCIN 1 MILLI-UNITS/MIN: 10 INJECTION INTRAVENOUS at 05:28

## 2023-03-22 RX ADMIN — OXYTOCIN 10000 MILLI-UNITS: 10 INJECTION INTRAVENOUS at 09:37

## 2023-03-22 NOTE — ROUTINE PROCESS
1600- Bedside shift change report given to Alex Abbasi RN (oncoming nurse) by Romina French RN (offgoing nurse). Report included the following information SBAR.

## 2023-03-22 NOTE — PROGRESS NOTES
Faculty or Preceptor Review    3/22/2023  - Shift times - 2128 to 9318    The documentation of patient care for Anish Mohs has been reviewed and approved.      Mary Aguirre

## 2023-03-22 NOTE — L&D DELIVERY NOTE
Delivery Summary  Patient: Marielos Hurst             Circumcision:   NA-female  Additional Delivery Comments - Uncomplicated . Spontaneous delivery over intact perineum. Very short cord. Clamped after 1 minute. Cord blood obtained. Placenta delivered with gentle traction on the cord. Small abrasion on left labia minora left vaginal sidewall junction that was hemostatic. Pt tolerated well. Information for the patient's :  Bart Crook [[id number]]     Delivery Type: Vaginal, Spontaneous   Delivery Date: 3/22/2023   Delivery Time: 9:34 AM     Birth Weight:       Sex:  female  Delivery Clinician:  Chris Emmanuel   Gestational Age: 37w0d    Presentation: Vertex   Position:             Apgars were 9  and 9      Resuscitation Method: Tactile Stimulation     Meconium Stained: None    Living Status: Living       Placenta Date/Time: 3/22/2023  9:39 AM   Placenta Removal: Spontaneous   Placenta Appearance: Normal    Cord Information:      Cord Events:         Disposition of Cord Blood: Lab    Blood Gases Sent?:  No     Cord pH:  none    Episiotomy: None   Laceration(s): None     Estimated Blood Loss (ml): No data found 150mL    Labor Events  Method:  [facility] Bulb (balloon)      Augmentation:    Cervical Ripening: 3/21/2023  6:26 PM  [facility]        Operative Vaginal Delivery - none

## 2023-03-22 NOTE — ROUTINE PROCESS
TRANSFER - IN REPORT:    Verbal report received from [employer], RN(name) on [facility]  being received from L&D(unit) for routine progression of care      Report consisted of patients Situation, Background, Assessment and   Recommendations(SBAR). Information from the following report(s) SBAR was reviewed with the receiving nurse. Opportunity for questions and clarification was provided. Assessment completed upon patients arrival to unit and care assumed.

## 2023-03-22 NOTE — PROGRESS NOTES
In room to meet patient. Feeling CTX. Uncomfortable. Thinking about epidural.    Visit Vitals  /73   Pulse 90   Temp 97.8 °F (36.6 °C)   Resp 16   Ht 5' 2\" (1.575 m)   Wt 63.5 kg (140 lb)   SpO2 99%   Breastfeeding No   BMI 25.61 kg/m²     SVE: 6/80/-2  EFM: Cat 1  [place]: q2-4    A/P: Pt is a 24 yo G1 at 43+0 wga for elective IOL.   - Epidural PRN  - Continue pitocin  - GBS neg  - Anticipate

## 2023-03-22 NOTE — ANESTHESIA PROCEDURE NOTES
Epidural Block    Patient location during procedure: OB  Start time: 3/22/2023 8:35 AM  End time: 3/22/2023 8:45 AM  Reason for block: labor epidural  Staffing  Performed: attending   Anesthesiologist: Rick Hair MD  Preanesthetic Checklist  Completed: patient identified, IV checked, site marked, risks and benefits discussed, surgical consent, monitors and equipment checked, pre-op evaluation and timeout performed  Block Placement  Patient position: left lateral decubitus  Prep: DuraPrep  Sterility prep: cap, drape, gloves and mask  Sedation level: no sedation  Patient monitoring: continuous pulse oximetry and heart rate  Approach: midline  Location: lumbar  Lumbar location: L3-L4  Epidural  Loss of resistance technique: saline and air  Guidance: landmark technique  Needle  Needle type: Tuohy   Needle gauge: 17 G  Needle length: 9 cm  Needle insertion depth: 7 cm  Catheter type: end hole  Catheter size: 19 G  Catheter at skin depth: 12 cm  Catheter securement method: clear occlusive dressing, liquid medical adhesive and surgical tape  Medications Administered  bupivacaine (PF) (MARCAINE) 0.5 % (5 mg/mL) Epidural - Epidural   30 mg - 3/22/2023 8:35:00 AM  Assessment  Sensory level: T6  Block outcome: pain improved  Number of attempts: 1  Procedure assessment: patient tolerated procedure well with no immediate complications

## 2023-03-22 NOTE — LACTATION NOTE
This note was copied from a baby's chart. Initial consult for term infant born vaginally today. Baby nursing well after delivery, deep latch obtained, mother is comfortable, baby feeding vigorously with rhythmic suck, swallow, breathe pattern, both breasts offered, baby is skin to skin for feeding. Mother has supportive family and plans to exclusively breastfeed. I assisted with latching then mother was able to do it independently. Mother has no further questions at this time.

## 2023-03-22 NOTE — ANESTHESIA POSTPROCEDURE EVALUATION
* No procedures listed *.    epidural    Anesthesia Post Evaluation        Patient location during evaluation: PACU  Patient participation: complete - patient participated  Level of consciousness: awake and alert  Pain management: adequate  Airway patency: patent  Anesthetic complications: no  Cardiovascular status: acceptable  Respiratory status: acceptable  Hydration status: acceptable  Comments: I have seen and evaluated the patient and is ready for discharge. Quynh Santoro MD    Post anesthesia nausea and vomiting:  none      INITIAL Post-op Vital signs: No vitals data found for the desired time range.

## 2023-03-22 NOTE — PROGRESS NOTES
0730: Bedside and Verbal shift change report given to [employer], RN (oncoming nurse) by Ester Hall RN (offgoing nurse). Report included the following information SBAR, MAR, and Recent Results. 2131: Dr. Jose Thompson at bedside. SVE 6/80/-2  0744: AROM moderate amount of clear fluid. 4054: Patient up to the bathroom. 2351: Dr. Barb Huffman at bedside for epidural  0845: Patient laying on left side after epidural   0855: Patient laying on right side with peanut ball   0921: SVE 10/100/0. Dr. Jose Thompson updated and called to bedside. 0561: Practice push  0930: Pushing   0932: Dr. Jose Thompson at bedside   7912:  of vigorous baby GIRL  1108: Patient up to bathroom. Void completed   1140: TRANSFER - OUT REPORT:    [facility] Marielos Hurst  being transferred to MIU (unit) for routine progression of care       Report consisted of patients Situation, Background, Assessment and   Recommendations(SBAR). Information from the following report(s) SBAR, Intake/Output, MAR, and Recent Results was reviewed with the receiving nurse. Lines:   Peripheral IV 23 Left Forearm (Active)   Site Assessment Clean, dry, & intact 23 1205   Phlebitis Assessment 0 23 1205   Infiltration Assessment 0 23 1205   Dressing Status Clean, dry, & intact 23 1205   Dressing Type Tape;Trach dressing 23 1205   Hub Color/Line Status Pink; Infusing 23 1205   Action Taken Blood drawn 23 1737   Alcohol Cap Used Yes 23 1737        Opportunity for questions and clarification was provided.       Patient transported with:   Registered Nurse

## 2023-03-22 NOTE — PROGRESS NOTES
Bedside and Verbal shift change report given to Glendy Romero RN (oncoming nurse) by Tricia Orellana RN (offgoing nurse). Report included the following information SBAR, Kardex, Intake/Output, MAR, Accordion, and Recent Results. 2021: RN at bedside. NST. Pt positive for FHR and contractions. Negative for vaginal bleeding, vaginal discharge, headache, visual disturbances, RUQ pain. VSS. Assessment complete. Pt resting w/ family at bedside. Call light within reach. 2120: Pt ambulating to bathroom. Pt states bergman balloon is still intact. 2125: Pt on left side w/ peanut ball. 0215: Bergman balloon came out. Pt does not want to be checked at this time. Right side w/ peanut ball. 5272: SVE 5-6/80/-1 H. Caren GUZMAN. Exaggerated runners w/ peanut ball. 0430: Pt on birthing ball. Plan to start pit when pt is in bed.     0528: Pit started (see MAR). Pt requesting break from peanut ball at this time. Pt denies pain management (epidural) at this time. 8658: RN at bedside. Pt resting quietly. Plan to continue w/ position changes when pt wakes up. Bedside and Verbal shift change report given to KRYSTA Roberts RN (oncoming nurse) by Glendy Romero RN (offgoing nurse). Report included the following information SBAR, Kardex, Intake/Output, MAR, Accordion, and Recent Results.

## 2023-03-23 PROCEDURE — 65410000002 HC RM PRIVATE OB

## 2023-03-23 PROCEDURE — 74011250637 HC RX REV CODE- 250/637: Performed by: OBSTETRICS & GYNECOLOGY

## 2023-03-23 RX ORDER — IBUPROFEN 800 MG/1
800 TABLET ORAL
Qty: 40 TABLET | Refills: 1 | Status: SHIPPED | OUTPATIENT
Start: 2023-03-23

## 2023-03-23 RX ADMIN — IBUPROFEN 800 MG: 400 TABLET ORAL at 06:26

## 2023-03-23 RX ADMIN — ACETAMINOPHEN 650 MG: 325 TABLET ORAL at 21:22

## 2023-03-23 RX ADMIN — ACETAMINOPHEN 650 MG: 325 TABLET ORAL at 12:42

## 2023-03-23 RX ADMIN — ACETAMINOPHEN 650 MG: 325 TABLET ORAL at 17:17

## 2023-03-23 RX ADMIN — ACETAMINOPHEN 650 MG: 325 TABLET ORAL at 04:37

## 2023-03-23 RX ADMIN — IBUPROFEN 800 MG: 400 TABLET ORAL at 16:38

## 2023-03-23 NOTE — LACTATION NOTE
This note was copied from a baby's chart. Observed infant at breast; assisted mom with obtaining a deeper latch. Infant consistent and swallows heard. Mom has easily expressed colostrum. Also helped mom with positioning in the football position, using pillows for support.

## 2023-03-23 NOTE — ROUTINE PROCESS
Bedside and Verbal shift change report given to Shanique Alexander (oncoming nurse) by Gi Aguilar (offgoing nurse). Report included the following information SBAR.

## 2023-03-23 NOTE — DISCHARGE INSTRUCTIONS
POST DELIVERY DISCHARGE INSTRUCTIONS    Name: Oralia Villalobos  YOB: 2000  Primary Diagnosis: Active Problems:    Normal pregnancy (3/21/2023)      Normal labor and delivery (3/23/2023)        General:     Diet/Diet Restrictions:  Eight 8-ounce glasses of fluid daily (water, juices); avoid excessive caffeine intake. Meals/snacks as desired which are high in fiber and carbohydrates and low in fat and cholesterol. Physical Activity / Restrictions / Safety:     Avoid heavy lifting, no more that 8 lbs. For 2-3 weeks;   Limit use of stairs to 2 times daily for the first week home. No driving for one week. Avoid intercourse 4-6 weeks, no douching or tampon use. Check with obstetrician before starting or resuming an exercise program.      Discharge Instructions/Special Treatment/Home Care Needs:     Continue prenatal vitamins. Continue to use squirt bottle with warm water on your episiotomy after each bathroom use until bleeding stops. If steri-strips applied to your incision, remove in 7-10 days. Call your doctor for the following:     Fever over 101 degrees by mouth. Vaginal bleeding heavier than a normal menstrual period or clots larger than a golf ball. Red streaks or increased swelling of legs, painful red streaks on your breast.  Painful urination, constipation and increased pain or swelling or discharge with your incision. If you feel extremely anxious or overwhelmed. If you have thoughts of harming yourself and/or your baby. Pain Management:     Take Acetaminophen (Tylenol) or Ibuprofen (Advil, Motrin), as directed for pain. Use a warm Sitz bath 3 times daily to relieve episiotomy or hemorrhoidal discomfort. For hemorrhoidal discomfort, use Tucks and Anusol cream as needed and directed. Heating pad to  incision as needed.      Follow-Up Care:     Appointment with MD: [unfilled]  Telephone number: [phone]    Signed By: Minh Pham MD Date: 3/23/2023 Time: 1:23 PM    Postpartum Support Groups   We know that all of us are dealing with a tremendous amount of uncertainty, confusion and disruption to our daily lives, which may result in increased anxiety, depression and fear. If you are feeling unsettled or worse, please know that we are here to help. During this time of increased caution and care for one another, [facility]partum [facility] [place] (Sandra [employer]ar[employer]) is offering virtual and in person support groups to ALL MOTHERS in [place] regardless of the age of your child/children as a way to help weather this emotional storm together. Social support is an important part of self-care during this time of physical distancing. Virtual postpartum support group meetings available at www. postpartumva.org  Warm Line: 631.292.3962    Breastfeeding Support Groups   1st and 3rd Wednesday of each month at [address] [place] [address]-11:00 AM  2nd and 4th Tuesday of each month at [facility] (in education center behind cafeteria)-10:00 AM

## 2023-03-23 NOTE — PROGRESS NOTES
Post-Partum Day Number 1 Progress Note    Leyla Gonsales     Assessment: Doing well, post partum day 1    Plan:  - Continue routine postpartum and perineal care as well as maternal education.  - N/A   - Plan discharge home [address] Discharge Date: Tomorrow. Information for the patient's :  Bright Paul [[id number]]   Vaginal, Spontaneous  Patient doing well without significant complaint. Voiding without difficulty, normal lochia. Vitals:  Visit Vitals  /67 (BP 1 Location: Right upper arm, BP Patient Position: At rest)   Pulse 89   Temp 97.4 °F (36.3 °C)   Resp 16   Ht 5' 2\" (1.575 m)   Wt 63.5 kg (140 lb)   SpO2 97%   Breastfeeding Unknown   BMI 25.61 kg/m²     Temp (24hrs), Av.8 °F (36.6 °C), Min:97.4 °F (36.3 °C), Max:98.2 °F (36.8 °C)        Exam:   Patient without distress. Fundus firm, nontender per nursing fundal checks. Perineum with normal lochia noted per nursing assessment. Lower extremities are negative for pathological edema. Labs:     Lab Results   Component Value Date/Time    WBC 12.3 (H) 2023 05:38 PM    WBC 10.0 2023 01:15 PM    HGB 10.7 (L) 2023 05:38 PM    HGB 10.2 (L) 2023 01:15 PM    HCT 32.0 (L) 2023 05:38 PM    HCT 31.6 (L) 2023 01:15 PM    PLATELET 117  05:38 PM    PLATELET 811  01:15 PM       No results found for this or any previous visit (from the past 24 hour(s)).

## 2023-03-23 NOTE — DISCHARGE SUMMARY
Obstetrical Discharge Summary     Name: Pillo Quintero MRN: [id number]  SSN: [id number]    YOB: 2000  Age: 23 y.o. Sex: female      Admit Date: 3/21/2023    Discharge Date: 3/24/2023     Admitting Physician: Fauzia Parsons MD     Attending Physician:  Sergey Villalba MD     Admission Diagnoses: Normal pregnancy [Z34.90]    Discharge Diagnoses:   Information for the patient's :  Frankie Ivory [[id number]]   Delivery of a 2.825 kg female infant via Vaginal, Spontaneous on 3/22/2023 at 9:34 AM  by Silviano Seat. Apgars were 9  and 9 . Additional Diagnoses:   Hospital Problems  Never Reviewed            Codes Class Noted POA    Normal labor and delivery ICD-10-CM: O80  ICD-9-CM: 668  3/23/2023 Unknown        Normal pregnancy ICD-10-CM: Z34.90  ICD-9-CM: V22.2  3/21/2023 Unknown          Lab Results   Component Value Date/Time    Rubella, External immune 2022 12:00 AM    GrBStrep, External negative 2023 12:00 AM       Hospital Course: Normal hospital course following the delivery. Patient Instructions:   Current Discharge Medication List        START taking these medications    Details   ibuprofen (MOTRIN) 800 mg tablet Take 1 Tablet by mouth every eight (8) hours as needed for Pain. Qty: 40 Tablet, Refills: 1           CONTINUE these medications which have NOT CHANGED    Details   PNV TX.79/JTVJBXB fum/folic ac (PRENATAL PO) Take 1 Tablet by mouth. STOP taking these medications       ferrous sulfate (Slow Fe) 142 mg (45 mg iron) ER tablet Comments:   Reason for Stopping:               Disposition at Discharge: Home or self care    Condition at Discharge: Stable    Reference my discharge instructions.     Follow-up Appointments   Procedures    FOLLOW UP VISIT Appointment in: 6 Weeks     Standing Status:   Standing     Number of Occurrences:   1     Order Specific Question:   Appointment in     Answer:   6 Weeks        Signed By:  Jodie Monte MD March 23, 2023

## 2023-03-23 NOTE — ROUTINE PROCESS
Bedside shift change report given to KEITH Painting (oncoming nurse) by Gal Chacko RN (offgoing nurse). Report included the following information SBAR.

## 2023-03-23 NOTE — PROGRESS NOTES
Bedside and Verbal shift change report given to DEVENDRA Gonzalez RN (oncoming nurse) by KEITH Mathew RN (offgoing nurse). Report included the following information SBAR and Kardex.

## 2023-03-24 VITALS
BODY MASS INDEX: 25.76 KG/M2 | TEMPERATURE: 97.7 F | HEART RATE: 87 BPM | DIASTOLIC BLOOD PRESSURE: 79 MMHG | WEIGHT: 140 LBS | HEIGHT: 62 IN | RESPIRATION RATE: 16 BRPM | SYSTOLIC BLOOD PRESSURE: 118 MMHG | OXYGEN SATURATION: 98 %

## 2023-03-24 PROCEDURE — 74011250637 HC RX REV CODE- 250/637: Performed by: OBSTETRICS & GYNECOLOGY

## 2023-03-24 RX ADMIN — ACETAMINOPHEN 650 MG: 325 TABLET ORAL at 01:54

## 2023-03-24 RX ADMIN — IBUPROFEN 800 MG: 400 TABLET ORAL at 09:09

## 2023-03-24 RX ADMIN — ACETAMINOPHEN 650 MG: 325 TABLET ORAL at 09:09

## 2023-03-24 RX ADMIN — ACETAMINOPHEN 650 MG: 325 TABLET ORAL at 05:11

## 2023-03-24 RX ADMIN — IBUPROFEN 800 MG: 400 TABLET ORAL at 00:21

## 2023-03-24 NOTE — ROUTINE PROCESS
0730 Bedside shift change report received from DEVENDRA Julien RN in [employer]. [facility] instructions given all questions answered. Pt. Has apt. In 6qks with Dr. Mary Ortiz. ID home.

## 2023-03-24 NOTE — PROGRESS NOTES
Post-Partum Day Number 2 Progress Note    Pillo Quintero     Assessment: Doing well, post partum day 2    Plan:   - Discharge home today  - Follow up in office in 6 week(s) with [facility].  - Pain medication prescription(s) sent. - Questions answered. Information for the patient's :  Frankie Ivory [[id number]]   Vaginal, Spontaneous  Patient doing well without significant complaint. Voiding without difficulty, normal lochia. Ready for discharge home. Vitals:  Visit Vitals  /61 (BP 1 Location: Right arm, BP Patient Position: At rest)   Pulse 98   Temp 97.9 °F (36.6 °C)   Resp 16   Ht 5' 2\" (1.575 m)   Wt 63.5 kg (140 lb)   SpO2 97%   Breastfeeding Unknown   BMI 25.61 kg/m²     Temp (24hrs), Av.8 °F (36.6 °C), Min:97.6 °F (36.4 °C), Max:97.9 °F (36.6 °C)      Exam:        Patient without distress. Fundus firm, nontender                  Perineum with normal lochia noted per nursing assessment                Lower extremities are negative for pathological edema    Labs:     Lab Results   Component Value Date/Time    WBC 12.3 (H) 2023 05:38 PM    WBC 10.0 2023 01:15 PM    HGB 10.7 (L) 2023 05:38 PM    HGB 10.2 (L) 2023 01:15 PM    HCT 32.0 (L) 2023 05:38 PM    HCT 31.6 (L) 2023 01:15 PM    PLATELET 246  05:38 PM    PLATELET 342  01:15 PM       No results found for this or any previous visit (from the past 24 hour(s)).

## 2023-03-24 NOTE — LACTATION NOTE
This note was copied from a baby's chart. Baby nursing well and has improved throughout post partum stay, deep latch maintained, mother taught how to attain and maintain deeper latch, and nipple care to compression damage from early shallow latching, milk is in transition, baby feeding vigorously with rhythmic suck, swallow, breathe pattern, with audible swallowing, and evident milk transfer, both breasts offerd, baby is asleep following feeding. Baby is feeding on demand, voiding and stools present as appropriate over the last 24 hours. Mother states that she has no further questions for Lactation Consultant before discharge.

## 2023-05-18 ENCOUNTER — HOSPITAL ENCOUNTER (OUTPATIENT)
Facility: HOSPITAL | Age: 23
End: 2023-05-18
Payer: MEDICAID

## 2023-05-18 ENCOUNTER — HOSPITAL ENCOUNTER (OUTPATIENT)
Facility: HOSPITAL | Age: 23
Discharge: HOME | End: 2023-05-18
Payer: MEDICAID

## 2023-05-18 DIAGNOSIS — R10.32 LLQ ABDOMINAL PAIN: ICD-10-CM

## 2023-05-18 PROCEDURE — 76830 TRANSVAGINAL US NON-OB: CPT

## 2023-05-18 PROCEDURE — 76856 US EXAM PELVIC COMPLETE: CPT

## 2023-05-18 PROCEDURE — 76700 US EXAM ABDOM COMPLETE: CPT

## 2023-12-27 VITALS
TEMPERATURE: 98 F | DIASTOLIC BLOOD PRESSURE: 95 MMHG | SYSTOLIC BLOOD PRESSURE: 138 MMHG | HEART RATE: 75 BPM | OXYGEN SATURATION: 100 % | HEIGHT: 62 IN | BODY MASS INDEX: 23.92 KG/M2 | WEIGHT: 130 LBS | RESPIRATION RATE: 16 BRPM

## 2023-12-28 ENCOUNTER — HOSPITAL ENCOUNTER (EMERGENCY)
Facility: HOSPITAL | Age: 23
Discharge: LEFT BEFORE DISCHARGE | End: 2023-12-28

## 2023-12-28 NOTE — ED TRIAGE NOTES
Pt reports abdominal cramping lower abd, vaginal bleeding that started as spotting today is like a menstral cycle. Pt reports a positive pregnancy test yesterday. LMP 1st day 2023. . Pt alert oriented ambulatory.